# Patient Record
Sex: FEMALE | Race: WHITE | NOT HISPANIC OR LATINO | Employment: FULL TIME | URBAN - METROPOLITAN AREA
[De-identification: names, ages, dates, MRNs, and addresses within clinical notes are randomized per-mention and may not be internally consistent; named-entity substitution may affect disease eponyms.]

---

## 2019-07-08 ENCOUNTER — OFFICE VISIT (OUTPATIENT)
Dept: URGENT CARE | Facility: PHYSICIAN GROUP | Age: 49
End: 2019-07-08
Payer: COMMERCIAL

## 2019-07-08 VITALS
HEART RATE: 70 BPM | WEIGHT: 170 LBS | BODY MASS INDEX: 26.68 KG/M2 | HEIGHT: 67 IN | OXYGEN SATURATION: 95 % | SYSTOLIC BLOOD PRESSURE: 138 MMHG | DIASTOLIC BLOOD PRESSURE: 88 MMHG | TEMPERATURE: 98.1 F

## 2019-07-08 DIAGNOSIS — J01.00 ACUTE MAXILLARY SINUSITIS, RECURRENCE NOT SPECIFIED: ICD-10-CM

## 2019-07-08 DIAGNOSIS — J98.8 RESPIRATORY TRACT INFECTION: ICD-10-CM

## 2019-07-08 PROCEDURE — 99204 OFFICE O/P NEW MOD 45 MIN: CPT | Performed by: PHYSICIAN ASSISTANT

## 2019-07-08 RX ORDER — DOXYCYCLINE HYCLATE 100 MG
100 TABLET ORAL 2 TIMES DAILY
Qty: 14 TAB | Refills: 0 | Status: SHIPPED | OUTPATIENT
Start: 2019-07-08 | End: 2019-07-15

## 2019-07-08 RX ORDER — ALBUTEROL SULFATE 90 UG/1
2 AEROSOL, METERED RESPIRATORY (INHALATION) EVERY 6 HOURS PRN
Qty: 8.5 G | Refills: 0 | Status: SHIPPED | OUTPATIENT
Start: 2019-07-08

## 2019-07-08 ASSESSMENT — ENCOUNTER SYMPTOMS
ABDOMINAL PAIN: 0
BLURRED VISION: 0
CHILLS: 0
WHEEZING: 0
DIARRHEA: 0
SPUTUM PRODUCTION: 0
FEVER: 1
RHINORRHEA: 1
BRUISES/BLEEDS EASILY: 0
PALPITATIONS: 0
MYALGIAS: 0
DIZZINESS: 0
EYE PAIN: 0
SHORTNESS OF BREATH: 0
NAUSEA: 0
SINUS PAIN: 1
COUGH: 1
HEADACHES: 1
VOMITING: 0
CONSTIPATION: 0
SORE THROAT: 1

## 2019-07-08 NOTE — PROGRESS NOTES
Subjective:      Mari Keith is a 49 y.o. female who presents with URI (x 4 days)      URI    This is a new problem. The current episode started in the past 7 days. The problem has been gradually worsening. Maximum temperature: Subjective fever. The fever has been present for 1 to 2 days. Associated symptoms include congestion, coughing, headaches, a plugged ear sensation, rhinorrhea, sinus pain and a sore throat. Pertinent negatives include no abdominal pain, chest pain, diarrhea, ear pain, nausea, rash, vomiting or wheezing. She has tried increased fluids and antihistamine for the symptoms. The treatment provided no relief.   Patient also reports some mild chest tightness.    Review of Systems   Constitutional: Positive for fever (Subjective) and malaise/fatigue. Negative for chills.   HENT: Positive for congestion, rhinorrhea, sinus pain and sore throat. Negative for ear pain.    Eyes: Negative for blurred vision and pain.   Respiratory: Positive for cough. Negative for sputum production, shortness of breath and wheezing.    Cardiovascular: Negative for chest pain and palpitations.   Gastrointestinal: Negative for abdominal pain, constipation, diarrhea, nausea and vomiting.   Musculoskeletal: Negative for myalgias.   Skin: Negative for rash.   Neurological: Positive for headaches. Negative for dizziness.   Endo/Heme/Allergies: Positive for environmental allergies. Does not bruise/bleed easily.       PMH:  has no past medical history on file.  MEDS:   Current Outpatient Prescriptions:   •  doxycycline (VIBRAMYCIN) 100 MG Tab, Take 1 Tab by mouth 2 times a day for 7 days., Disp: 14 Tab, Rfl: 0  •  albuterol (PROAIR HFA) 108 (90 Base) MCG/ACT Aero Soln inhalation aerosol, Inhale 2 Puffs by mouth every 6 hours as needed for Shortness of Breath., Disp: 8.5 g, Rfl: 0  ALLERGIES:   Allergies   Allergen Reactions   • Penicillins      Irregular HR, Rash   • Codeine      Double vision   • Vicodin  "[Hydrocodone-Acetaminophen]      Nausea     SURGHX: History reviewed. No pertinent surgical history.  SOCHX:  reports that she has been smoking Cigarettes.  She has never used smokeless tobacco. She reports that she does not drink alcohol or use drugs.  FH: Family history was reviewed, no pertinent findings to report     Objective:     /88   Pulse 70   Temp 36.7 °C (98.1 °F) (Temporal)   Ht 1.702 m (5' 7\")   Wt 77.1 kg (170 lb)   SpO2 95%   BMI 26.63 kg/m²      Physical Exam   Constitutional: She is oriented to person, place, and time. She appears well-developed and well-nourished.   HENT:   Head: Normocephalic and atraumatic.   Right Ear: Tympanic membrane, external ear and ear canal normal.   Left Ear: Tympanic membrane, external ear and ear canal normal.   Nose: Mucosal edema and rhinorrhea present. Right sinus exhibits no maxillary sinus tenderness and no frontal sinus tenderness. Left sinus exhibits maxillary sinus tenderness. Left sinus exhibits no frontal sinus tenderness.   Mouth/Throat: Uvula is midline, oropharynx is clear and moist and mucous membranes are normal.   Eyes: Pupils are equal, round, and reactive to light. Conjunctivae are normal.   Neck: Normal range of motion.   Cardiovascular: Normal rate, regular rhythm and normal heart sounds.    No murmur heard.  Pulmonary/Chest: Effort normal and breath sounds normal. No accessory muscle usage. No respiratory distress. She has no decreased breath sounds. She has no wheezes. She has no rhonchi. She has no rales.   Lymphadenopathy:     She has cervical adenopathy.   Neurological: She is alert and oriented to person, place, and time.   Skin: Skin is warm and dry. Capillary refill takes less than 2 seconds.   Psychiatric: She has a normal mood and affect. Her behavior is normal. Judgment normal.        Assessment/Plan:     1. Acute maxillary sinusitis, recurrence not specified  - doxycycline (VIBRAMYCIN) 100 MG Tab; Take 1 Tab by mouth 2 times " a day for 7 days.  Dispense: 14 Tab; Refill: 0    2. Respiratory tract infection  - albuterol (PROAIR HFA) 108 (90 Base) MCG/ACT Aero Soln inhalation aerosol; Inhale 2 Puffs by mouth every 6 hours as needed for Shortness of Breath.  Dispense: 8.5 g; Refill: 0        Differential Diagnosis, natural history, and supportive care discussed. Return to the Urgent Care or follow up with your PCP if symptoms fail to resolve, or for any new or worsening symptoms. Emergency room precautions discussed. Patient and/or family appears understanding of information.

## 2022-02-05 ENCOUNTER — EMERGENCY (EMERGENCY)
Facility: HOSPITAL | Age: 52
LOS: 1 days | Discharge: HOME | End: 2022-02-05
Attending: STUDENT IN AN ORGANIZED HEALTH CARE EDUCATION/TRAINING PROGRAM
Payer: MEDICAID

## 2022-02-05 VITALS
RESPIRATION RATE: 18 BRPM | SYSTOLIC BLOOD PRESSURE: 177 MMHG | HEIGHT: 63 IN | DIASTOLIC BLOOD PRESSURE: 83 MMHG | OXYGEN SATURATION: 98 % | TEMPERATURE: 98 F | WEIGHT: 210.1 LBS | HEART RATE: 73 BPM

## 2022-02-05 PROCEDURE — 99283 EMERGENCY DEPT VISIT LOW MDM: CPT

## 2022-02-05 PROCEDURE — 73660 X-RAY EXAM OF TOE(S): CPT | Mod: 26,RT

## 2022-02-05 RX ORDER — ACETAMINOPHEN 500 MG
975 TABLET ORAL ONCE
Refills: 0 | Status: COMPLETED | OUTPATIENT
Start: 2022-02-05 | End: 2022-02-05

## 2022-02-05 NOTE — ED PROVIDER NOTE - PATIENT PORTAL LINK FT
You can access the FollowMyHealth Patient Portal offered by [facility] by registering at the following website: [email or web address]. By joining [employer]’s FollowMyHealth portal, you will also be able to view your health information using other applications (apps) compatible with our system.

## 2022-02-05 NOTE — ED PROVIDER NOTE - PROGRESS NOTE DETAILS
XR prelim read There is a minimally displaced fracture of the tuft of the fifth distal   phalanx.  Mild arthrosis is seen throughout the interphalangeal joints.  Will perform willow taping of the toe to adjacent toe and give hard sole shoe. Should F/U outpatient w/ Podiatry. Review strict ER return precautions. Raj Huff PA-C

## 2022-02-05 NOTE — ED PROVIDER NOTE - ATTENDING CONTRIBUTION TO CARE
I have personally seen and examined this patient.  I have fully participated in the care of this patient. I performed a substantive portion of the visit including all aspects of the medical decision making. I have reviewed all pertinent clinical information, including history, physical exam, plan and the ACP’s note and agree except as noted. - MD Ralph.    50 yo F, with DM, p/w right 5th toe pain, no redness, very tender to touch, no wamth, pt denies trauma, gout vs. inflammatory, potentially fx. xray, pain meds,

## 2022-02-05 NOTE — ED PROVIDER NOTE - NSFOLLOWUPCLINICS_GEN_ALL_ED_FT
[facility]  Podiatry  [address] [place] [address] - [address] [place] [address]  Phone: [phone]  Fax:

## 2022-02-05 NOTE — ED PROVIDER NOTE - OBJECTIVE STATEMENT
ROMELIA MCKEE is a 51 YEAR OLD FEMALE PMH DM, HTN, who presents to ER for CC of Toe Pain.  Event Leading Up To: Unknown  Onset: 3 Days Ago  Location: R 5th Digit  Duration: Constant, Worsening  Character: Painful, Swollen, "hurts when touching"  Aggravate: Touching affected area; Alleviate: No Known Factors  Radiation: NONE  Timing: First Time this has occurred  Denies fevers, chills, cut to the affected area, purulent drainage, trauma to the affected area  PMH: DM, HTN  Meds: Metformin, Unknown HTN Medication  PSH: NONE  NKDA  IUTD  Social Hx: No EtOH/marijuana/drugs/nicotine/tobacco, No HIV/STI

## 2022-02-05 NOTE — ED PROVIDER NOTE - NSFOLLOWUPINSTRUCTIONS_ED_ALL_ED_FT
You were seen in the ER and diagnosed with a Broken Bone of your Right Pinky Toe.    We taped the adjacent toes together and gave you a hard sole shoe.    Please use Tylenol 975mg every 6 Hours as needed for pain.    Please follow up with Podiatry ASAP and within 7 days - call to make an appointment.    Return to the ER for coldness of the digit, inability to feel the digit, inability to move the digit, or any other emergency concerns.

## 2022-02-05 NOTE — ED PROVIDER NOTE - CLINICAL SUMMARY MEDICAL DECISION MAKING FREE TEXT BOX
ROMELIA MCKEE is a 51 YEAR OLD FEMALE PMH DM, HTN, who presents to ER for CC of Toe Pain of R 5th Digit since 3 days ago that is worsening in intensity and is swollen and hurts more with touching the affected area. VSS. PE sig for TTP and swelling of R 5th Digit. DDx includes fracture or contusion of joint, Gout or Pseudogout. Will obtain lab work up including CBC, CMP, ESR, CRP, and Uric Acid. Obtain XR of affected joint. Tylenol for pain. ROMELIA MCKEE is a 51 YEAR OLD FEMALE PMH DM, HTN, who presents to ER for CC of Toe Pain of R 5th Digit since 3 days ago that is worsening in intensity and is swollen and hurts more with touching the affected area. VSS. PE sig for TTP and swelling of R 5th Digit. DDx includes fracture or contusion of joint, Gout or Pseudogout. Will obtain XR of affected joint. Tylenol for pain. Then consider need for further work up.

## 2022-02-06 VITALS
SYSTOLIC BLOOD PRESSURE: 160 MMHG | DIASTOLIC BLOOD PRESSURE: 76 MMHG | OXYGEN SATURATION: 99 % | TEMPERATURE: 98 F | RESPIRATION RATE: 18 BRPM | HEART RATE: 70 BPM

## 2022-02-06 PROCEDURE — 73660 X-RAY EXAM OF TOE(S): CPT

## 2022-02-06 PROCEDURE — 99283 EMERGENCY DEPT VISIT LOW MDM: CPT | Mod: 25

## 2022-02-06 RX ADMIN — Medication 975 MILLIGRAM(S): at 00:17

## 2022-02-06 NOTE — ED ADULT NURSE NOTE - OBJECTIVE STATEMENT
51 y/o F A&Ox3 PMH DM denies pertinent PSH presents to the ED c/o right toe pain. Pt reports right sided toe pain x3 days. Pt unsure if she injured toe. Upon arrival to ED pt is well appearing. Breathing is even and unlabored. Skin is warm, dry & in tact. Right small toe appears red and slightly swollen. Pt ambulated independently w/ steady gait. Denies CP, SOB, HA, N/V/D, numbness, tingling, fevers, chills. Comfort & safety provided.

## 2022-06-09 NOTE — ED PROCEDURE NOTE - CPROC ED COMPLICATIONS1
Beba is calling to request a refill of the prescribed oxycodone. The preferred pharmacy is listed. Please advise, thank you.    no

## 2022-07-28 ENCOUNTER — EMERGENCY (EMERGENCY)
Facility: HOSPITAL | Age: 52
LOS: 1 days | Discharge: HOME | End: 2022-07-28
Attending: EMERGENCY MEDICINE
Payer: MEDICAID

## 2022-07-28 VITALS
RESPIRATION RATE: 20 BRPM | SYSTOLIC BLOOD PRESSURE: 159 MMHG | HEART RATE: 82 BPM | OXYGEN SATURATION: 98 % | TEMPERATURE: 100 F | WEIGHT: 210.1 LBS | DIASTOLIC BLOOD PRESSURE: 76 MMHG | HEIGHT: 63 IN

## 2022-07-28 PROCEDURE — 99284 EMERGENCY DEPT VISIT MOD MDM: CPT

## 2022-07-29 VITALS
HEART RATE: 67 BPM | SYSTOLIC BLOOD PRESSURE: 137 MMHG | TEMPERATURE: 98 F | DIASTOLIC BLOOD PRESSURE: 78 MMHG | OXYGEN SATURATION: 99 % | RESPIRATION RATE: 18 BRPM

## 2022-07-29 PROBLEM — I10 ESSENTIAL (PRIMARY) HYPERTENSION: Chronic | Status: ACTIVE | Noted: 2022-02-05

## 2022-07-29 PROBLEM — E11.9 TYPE 2 DIABETES MELLITUS WITHOUT COMPLICATIONS: Chronic | Status: ACTIVE | Noted: 2022-02-05

## 2022-07-29 LAB
RAPID RVP RESULT: DETECTED
SARS-COV-2 RNA SPEC QL NAA+PROBE: DETECTED

## 2022-07-29 PROCEDURE — 0225U NFCT DS DNA&RNA 21 SARSCOV2: CPT

## 2022-07-29 PROCEDURE — 99285 EMERGENCY DEPT VISIT HI MDM: CPT | Mod: 25

## 2022-07-29 PROCEDURE — 71045 X-RAY EXAM CHEST 1 VIEW: CPT | Mod: 26

## 2022-07-29 PROCEDURE — 71045 X-RAY EXAM CHEST 1 VIEW: CPT

## 2022-07-29 PROCEDURE — 96372 THER/PROPH/DIAG INJ SC/IM: CPT

## 2022-07-29 RX ORDER — KETOROLAC TROMETHAMINE 30 MG/ML
15 SYRINGE (ML) INJECTION ONCE
Refills: 0 | Status: DISCONTINUED | OUTPATIENT
Start: 2022-07-29 | End: 2022-07-29

## 2022-07-29 RX ADMIN — Medication 100 MILLIGRAM(S): at 00:46

## 2022-07-29 RX ADMIN — Medication 15 MILLIGRAM(S): at 00:37

## 2022-07-29 NOTE — ED PROVIDER NOTE - CLINICAL SUMMARY MEDICAL DECISION MAKING FREE TEXT BOX
Patient with 4 days of viral symptoms, tolerating PO, well appearing, has sick contact. No hypoxia, clear lungs. COncerned about more dangerous underlying process than virus. Will screen for PNA with XR. IM toradol, viral swab. If remains well appearing will dc. No indication for labs, no suspicion cardiac disease or PE with this history and exam.

## 2022-07-29 NOTE — ED PROVIDER NOTE - OBJECTIVE STATEMENT
53 y/o f hx htn here with 4 days cough, aches, fever. Taking tylenol/motrin without relief. Sick contact  at home. Vaccinatged for covid. no cp or sob. did not get swabbed. Tolerating PO, no n/v, did develop some diarrhea (non bloody) today.

## 2022-07-29 NOTE — ED PROVIDER NOTE - PATIENT PORTAL LINK FT
You can access the FollowMyHealth Patient Portal offered by [facility] by registering at the following website: [email or web address]. By joining [employer]’s FollowMyHealth portal, you will also be able to view your health information using other applications (apps) compatible with our system.

## 2022-07-29 NOTE — ED PROVIDER NOTE - NSFOLLOWUPINSTRUCTIONS_ED_ALL_ED_FT
You were evaluated in the ER and we believe your symptoms are due to a virus.    You can take TYLENOL/ACETAMINOPHEN up to 4,000mg a day for your symptoms in four divided doses.     You can take MOTRIN/IBUPROFEN up to 2,400mg a day in four divided doses (for up to 5 days with food).     You should follow up with your PCP within 1-2 weeks for re evaluation.     Return to the ER for new chest pain, difficulty breathing, passing out, inability to tolerate eating and drinking, or for any concern you would like evaluated.

## 2022-07-29 NOTE — ED PROVIDER NOTE - NS ED ROS FT
Constitutional: (+) fever (-) vomiting  Eyes/ENT: (-) vision changes, (-) hearing changes  Cardiovascular: (-) chest pain, (-) wheezing  Respiratory: (+) cough, (-) shortness of breath  Gastrointestinal: (-) vomiting, (+) diarrhea, (-) abdominal pain  : (-) dysuria   Musculoskeletal: (-) back pain  Integumentary: (-) rash, (-) edema  Neurological: (-)loc  Allergic/Immunologic: (-) pruritus  Endocrine: No history of thyroid disease

## 2022-07-29 NOTE — ED ADULT NURSE NOTE - NSIMPLEMENTINTERV_GEN_ALL_ED
Implemented All Universal Safety Interventions:  [place] to call system. Call bell, personal items and telephone within reach. Instruct patient to call for assistance. Room bathroom lighting operational. Non-slip footwear when patient is off stretcher. Physically safe environment: no spills, clutter or unnecessary equipment. Stretcher in lowest position, wheels locked, appropriate side rails in place.

## 2022-07-29 NOTE — ED ADULT NURSE NOTE - OBJECTIVE STATEMENT
51 y/o F AxOx4, arrived from home complaining of cough x4 days. PMH HTN and DM. Pt endorses dry hacking cough, body aches, HA, dec. appetite with diarrhea after meals. Pt states she had fever 103F earlier today, 98.9F at time of assessment.  also being seen in ER for similar symptoms. Respirations spontaneous and unlabored. Denies SOB, dyspnea chest pain, palpitations. No abdominal pain, soft NT/ND. No n/v. Denies urinary symptoms. Denies chills. Skin is warm/dry and normal for race. Ambulates at baseline.

## 2022-07-30 NOTE — ED POST DISCHARGE NOTE - DETAILS
7/30 - Zoe Nunes PA-C 7/31 - no answer and no ability to leave VM today. - Zoe Nunes PA-C 8/1 Ronnie Evans PA-C: Unable to leave voicemail at both numbers in chart. Pt not reachable, Billing report with patient info and instructions to call back given to josh Pimentel.

## 2023-07-31 NOTE — ED ADULT NURSE NOTE - NSFALLRSKUNASSIST_ED_ALL_ED
DVT ppx: heparin 5000u SQ q8h  Diet: dash/tlc, cc  Code status: full code pending discussion  Dispo: pending clinical improvement no